# Patient Record
Sex: FEMALE | Race: BLACK OR AFRICAN AMERICAN | NOT HISPANIC OR LATINO
[De-identification: names, ages, dates, MRNs, and addresses within clinical notes are randomized per-mention and may not be internally consistent; named-entity substitution may affect disease eponyms.]

---

## 2019-08-19 ENCOUNTER — APPOINTMENT (OUTPATIENT)
Dept: PULMONOLOGY | Facility: CLINIC | Age: 54
End: 2019-08-19
Payer: COMMERCIAL

## 2019-08-19 VITALS
SYSTOLIC BLOOD PRESSURE: 110 MMHG | OXYGEN SATURATION: 98 % | TEMPERATURE: 97.3 F | DIASTOLIC BLOOD PRESSURE: 80 MMHG | BODY MASS INDEX: 39.93 KG/M2 | HEART RATE: 77 BPM | HEIGHT: 62 IN | WEIGHT: 217 LBS

## 2019-08-19 DIAGNOSIS — Z82.5 FAMILY HISTORY OF ASTHMA AND OTHER CHRONIC LOWER RESPIRATORY DISEASES: ICD-10-CM

## 2019-08-19 DIAGNOSIS — Z86.79 PERSONAL HISTORY OF OTHER DISEASES OF THE CIRCULATORY SYSTEM: ICD-10-CM

## 2019-08-19 DIAGNOSIS — Z87.891 PERSONAL HISTORY OF NICOTINE DEPENDENCE: ICD-10-CM

## 2019-08-19 PROCEDURE — 94010 BREATHING CAPACITY TEST: CPT

## 2019-08-19 PROCEDURE — 99203 OFFICE O/P NEW LOW 30 MIN: CPT | Mod: 25

## 2019-08-19 RX ORDER — TRIAMTERENE
POWDER (GRAM) MISCELLANEOUS
Refills: 0 | Status: ACTIVE | COMMUNITY

## 2019-08-28 NOTE — ASSESSMENT
[FreeTextEntry1] : Data reviewed:\par \par [place] 08/19/2019 : normal\par \par Impression:\par Unspecified chest imaging abnormality several years ago\par Former smoker, 40py, but quit 20 years ago\par \par Plan:\par Unfortunately, there is not much I can do with this consultation unless some additional information is provided. The patient should contact her previous pulmonologist's office or her internist's office and see where these images were obtained. Then she should provide the images on disk as well as the report, so that I can determine whether additional imaging is necessary.\par --\par 8/28/19: The patient provided her outside disk dated 6/17/16 but no report. I do not see any significant abnormality on review of the disk. Will ask her to provide a report. Disk filed in my cabinet. / Report received. 4mm calcified granuloma and 4mm nodule. No follow up is required. JASWINDER on .

## 2019-08-28 NOTE — HISTORY OF PRESENT ILLNESS
[FreeTextEntry1] : 08/19/2019: Asked to evaluate patient by Dr Mccoy for abnormal chest imaging. Had an abnormal CXR years ago in the setting of pleurisy. Has seen Dr Manuel for these bouts of pleurisy and had a CT chest at [facility]. She doesn't have the results of this. Smoked 2ppd x 20 years and quit 20 years ago. No dyspnea or cough now. No pleurisy in 2 years.\par

## 2019-08-28 NOTE — PHYSICAL EXAM
[General Appearance - Well Developed] : well developed [General Appearance - Well Nourished] : well nourished [General Appearance - In No Acute Distress] : no acute distress [Normal Oropharynx] : normal oropharynx [Normal Conjunctiva] : the conjunctiva exhibited no abnormalities [Heart Rate And Rhythm] : heart rate and rhythm were normal [Heart Sounds] : normal S1 and S2 [Murmurs] : no murmurs present [Edema] : no peripheral edema present [Bowel Sounds] : normal bowel sounds [Auscultation Breath Sounds / Voice Sounds] : lungs were clear to auscultation bilaterally [Abdomen Soft] : soft [Abdomen Tenderness] : non-tender [Nail Clubbing] : no clubbing of the fingernails [Cyanosis, Localized] : no localized cyanosis [] : no rash [Affect] : the affect was normal [FreeTextEntry1] : no LAD

## 2019-08-28 NOTE — CONSULT LETTER
[Dear  ___] : Dear  [unfilled], [( Thank you for referring [unfilled] for consultation for _____ )] : Thank you for referring [unfilled] for consultation for [unfilled] [Consult Closing:] : Thank you very much for allowing me to participate in the care of this patient.  If you have any questions, please do not hesitate to contact me. [Sincerely,] : Sincerely, [FreeTextEntry1] : Unfortunately, no reports or images were provided from these reportedly abnormal studies, so I cannot ascertain whether repeat imaging is indicated. If you have any relevant information, could you kindly forward it to my office? [FreeTextEntry2] : Victoria Mccoy MD\par [address]\[place] [address] [FreeTextEntry3] : Emily Burns MD, FCCP\par

## 2020-01-12 ENCOUNTER — TRANSCRIPTION ENCOUNTER (OUTPATIENT)
Age: 55
End: 2020-01-12

## 2020-01-30 ENCOUNTER — APPOINTMENT (OUTPATIENT)
Dept: UROLOGY | Facility: CLINIC | Age: 55
End: 2020-01-30
Payer: COMMERCIAL

## 2020-01-30 VITALS
TEMPERATURE: 98.7 F | SYSTOLIC BLOOD PRESSURE: 132 MMHG | BODY MASS INDEX: 42.6 KG/M2 | HEART RATE: 67 BPM | WEIGHT: 217 LBS | DIASTOLIC BLOOD PRESSURE: 87 MMHG | HEIGHT: 60 IN

## 2020-01-30 DIAGNOSIS — Z00.00 ENCOUNTER FOR GENERAL ADULT MEDICAL EXAMINATION W/OUT ABNORMAL FINDINGS: ICD-10-CM

## 2020-01-30 DIAGNOSIS — N30.00 ACUTE CYSTITIS W/OUT HEMATURIA: ICD-10-CM

## 2020-01-30 PROCEDURE — 81003 URINALYSIS AUTO W/O SCOPE: CPT | Mod: QW

## 2020-01-30 PROCEDURE — 99215 OFFICE O/P EST HI 40 MIN: CPT

## 2020-01-30 RX ORDER — METHENAMINE, SODIUM PHOSPHATE, MONOBASIC, MONOHYDRATE, PHENYL SALICYLATE, METHYLENE BLUE, AND HYOSCYAMINE SULFATE 118; 40.8; 36; 10; .12 MG/1; MG/1; MG/1; MG/1; MG/1
118 CAPSULE ORAL 4 TIMES DAILY
Qty: 16 | Refills: 3 | Status: ACTIVE | COMMUNITY
Start: 2020-01-30 | End: 1900-01-01

## 2020-01-30 NOTE — HISTORY OF PRESENT ILLNESS
[FreeTextEntry1] : Pt is a 53 yo former pt of Dr. Minaya who historically has been treated with bladder instillations for bladder irritability.  At the time of her last visit (8/21/19-see old chart) she had increased bladder symptoms likely stemming from her pelvic floor as her gait has changed immediately following left total hip replacement.  \par \par She presents today with "bladder burning".  She denies dysuria.   She was recently treated with a round of oral antibiotics by her gynecologist for a "parasitic infection" after sexual intercourse with a new partner.  She states her vaginal discharge has resolved but she has suprapubic discomfort.  \par \par \par Her CT scan and cystoscopy in 2017 were negative.

## 2020-01-30 NOTE — LETTER BODY
[Dear  ___] : Dear  [unfilled], [Consult Letter:] : I had the pleasure of evaluating your patient, [unfilled]. [Please see my note below.] : Please see my note below. [Sincerely,] : Sincerely, [Consult Closing:] : Thank you very much for allowing me to participate in the care of this patient.  If you have any questions, please do not hesitate to contact me. [FreeTextEntry3] : Dr. Alia Luo\par

## 2020-01-30 NOTE — PHYSICAL EXAM
[General Appearance - Well Developed] : well developed [General Appearance - Well Nourished] : well nourished [Normal Appearance] : normal appearance [Well Groomed] : well groomed [General Appearance - In No Acute Distress] : no acute distress [Abdomen Soft] : soft [Abdomen Tenderness] : non-tender [Costovertebral Angle Tenderness] : no ~M costovertebral angle tenderness [Urinary Bladder Findings] : the bladder was normal on palpation [Edema] : no peripheral edema [] : no respiratory distress [Exaggerated Use Of Accessory Muscles For Inspiration] : no accessory muscle use [Respiration, Rhythm And Depth] : normal respiratory rhythm and effort [Oriented To Time, Place, And Person] : oriented to person, place, and time [Affect] : the affect was normal [Mood] : the mood was normal [Not Anxious] : not anxious [Normal Station and Gait] : the gait and station were normal for the patient's age [No Focal Deficits] : no focal deficits [No Palpable Adenopathy] : no palpable adenopathy

## 2020-01-30 NOTE — ASSESSMENT
[FreeTextEntry1] : 53 yo female with recent vaginal infection now with suprapubic discomfort.   I will rule out infectious etiology and if she is no better by next week, she will return.   In the meantime I have given her Uribel to keep her comfortable.

## 2020-01-31 LAB
BILIRUB UR QL STRIP: NORMAL
CLARITY UR: CLEAR
COLLECTION METHOD: NORMAL
GLUCOSE UR-MCNC: NORMAL
HCG UR QL: 0.2 EU/DL
HGB UR QL STRIP.AUTO: NORMAL
KETONES UR-MCNC: NORMAL
LEUKOCYTE ESTERASE UR QL STRIP: NORMAL
NITRITE UR QL STRIP: NORMAL
PH UR STRIP: 6.5
PROT UR STRIP-MCNC: NORMAL
SP GR UR STRIP: 1.02

## 2020-02-03 LAB — BACTERIA UR CULT: NORMAL

## 2020-02-04 LAB — URINE CYTOLOGY: NORMAL

## 2020-02-12 LAB
MYCOPLASMA HOMINIS CULTURE: NORMAL
UREA SPECIMEN SOURCE: NORMAL
UREAPLASMA CULTURE: NORMAL
UREAPLASMA, PRELIMINARY CULTURE: NORMAL

## 2021-07-25 ENCOUNTER — EMERGENCY (EMERGENCY)
Facility: HOSPITAL | Age: 56
LOS: 1 days | Discharge: HOME | End: 2021-07-25
Attending: EMERGENCY MEDICINE | Admitting: EMERGENCY MEDICINE
Payer: COMMERCIAL

## 2021-07-25 VITALS
SYSTOLIC BLOOD PRESSURE: 136 MMHG | DIASTOLIC BLOOD PRESSURE: 74 MMHG | HEART RATE: 73 BPM | RESPIRATION RATE: 16 BRPM | OXYGEN SATURATION: 99 % | TEMPERATURE: 98 F

## 2021-07-25 VITALS
HEART RATE: 76 BPM | OXYGEN SATURATION: 98 % | DIASTOLIC BLOOD PRESSURE: 83 MMHG | RESPIRATION RATE: 16 BRPM | WEIGHT: 229.94 LBS | HEIGHT: 67 IN | TEMPERATURE: 98 F | SYSTOLIC BLOOD PRESSURE: 134 MMHG

## 2021-07-25 DIAGNOSIS — R00.0 TACHYCARDIA, UNSPECIFIED: ICD-10-CM

## 2021-07-25 DIAGNOSIS — Z20.822 CONTACT WITH AND (SUSPECTED) EXPOSURE TO COVID-19: ICD-10-CM

## 2021-07-25 DIAGNOSIS — R25.2 CRAMP AND SPASM: ICD-10-CM

## 2021-07-25 DIAGNOSIS — R07.89 OTHER CHEST PAIN: ICD-10-CM

## 2021-07-25 DIAGNOSIS — K21.9 GASTRO-ESOPHAGEAL REFLUX DISEASE WITHOUT ESOPHAGITIS: ICD-10-CM

## 2021-07-25 DIAGNOSIS — Z79.82 LONG TERM (CURRENT) USE OF ASPIRIN: ICD-10-CM

## 2021-07-25 LAB
ALBUMIN SERPL ELPH-MCNC: 4.5 G/DL — SIGNIFICANT CHANGE UP (ref 3.3–5)
ALP SERPL-CCNC: 72 U/L — SIGNIFICANT CHANGE UP (ref 40–120)
ALT FLD-CCNC: 17 U/L — SIGNIFICANT CHANGE UP (ref 10–45)
ANION GAP SERPL CALC-SCNC: 8 MMOL/L — SIGNIFICANT CHANGE UP (ref 5–17)
APTT BLD: 35.3 SEC — SIGNIFICANT CHANGE UP (ref 27.5–35.5)
AST SERPL-CCNC: 22 U/L — SIGNIFICANT CHANGE UP (ref 10–40)
BASOPHILS # BLD AUTO: 0.13 K/UL — SIGNIFICANT CHANGE UP (ref 0–0.2)
BASOPHILS NFR BLD AUTO: 2.7 % — HIGH (ref 0–2)
BILIRUB SERPL-MCNC: 0.2 MG/DL — SIGNIFICANT CHANGE UP (ref 0.2–1.2)
BUN SERPL-MCNC: 16 MG/DL — SIGNIFICANT CHANGE UP (ref 7–23)
CALCIUM SERPL-MCNC: 9.7 MG/DL — SIGNIFICANT CHANGE UP (ref 8.4–10.5)
CHLORIDE SERPL-SCNC: 104 MMOL/L — SIGNIFICANT CHANGE UP (ref 96–108)
CO2 SERPL-SCNC: 31 MMOL/L — SIGNIFICANT CHANGE UP (ref 22–31)
CREAT SERPL-MCNC: 1.22 MG/DL — SIGNIFICANT CHANGE UP (ref 0.5–1.3)
D DIMER BLD IA.RAPID-MCNC: <150 NG/ML DDU — SIGNIFICANT CHANGE UP
EOSINOPHIL # BLD AUTO: 0.04 K/UL — SIGNIFICANT CHANGE UP (ref 0–0.5)
EOSINOPHIL NFR BLD AUTO: 0.9 % — SIGNIFICANT CHANGE UP (ref 0–6)
GLUCOSE SERPL-MCNC: 87 MG/DL — SIGNIFICANT CHANGE UP (ref 70–99)
HCT VFR BLD CALC: 41.4 % — SIGNIFICANT CHANGE UP (ref 34.5–45)
HGB BLD-MCNC: 12.4 G/DL — SIGNIFICANT CHANGE UP (ref 11.5–15.5)
INR BLD: 0.99 — SIGNIFICANT CHANGE UP (ref 0.88–1.16)
LYMPHOCYTES # BLD AUTO: 1.67 K/UL — SIGNIFICANT CHANGE UP (ref 1–3.3)
LYMPHOCYTES # BLD AUTO: 33.9 % — SIGNIFICANT CHANGE UP (ref 13–44)
MCHC RBC-ENTMCNC: 21.8 PG — LOW (ref 27–34)
MCHC RBC-ENTMCNC: 30 GM/DL — LOW (ref 32–36)
MCV RBC AUTO: 72.9 FL — LOW (ref 80–100)
MONOCYTES # BLD AUTO: 0.48 K/UL — SIGNIFICANT CHANGE UP (ref 0–0.9)
MONOCYTES NFR BLD AUTO: 9.8 % — SIGNIFICANT CHANGE UP (ref 2–14)
NEUTROPHILS # BLD AUTO: 2.34 K/UL — SIGNIFICANT CHANGE UP (ref 1.8–7.4)
NEUTROPHILS NFR BLD AUTO: 47.3 % — SIGNIFICANT CHANGE UP (ref 43–77)
NT-PROBNP SERPL-SCNC: 157 PG/ML — SIGNIFICANT CHANGE UP (ref 0–300)
PLATELET # BLD AUTO: 311 K/UL — SIGNIFICANT CHANGE UP (ref 150–400)
POTASSIUM SERPL-MCNC: 3.6 MMOL/L — SIGNIFICANT CHANGE UP (ref 3.5–5.3)
POTASSIUM SERPL-SCNC: 3.6 MMOL/L — SIGNIFICANT CHANGE UP (ref 3.5–5.3)
PROT SERPL-MCNC: 7.4 G/DL — SIGNIFICANT CHANGE UP (ref 6–8.3)
PROTHROM AB SERPL-ACNC: 11.9 SEC — SIGNIFICANT CHANGE UP (ref 10.6–13.6)
RBC # BLD: 5.68 M/UL — HIGH (ref 3.8–5.2)
RBC # FLD: 15.8 % — HIGH (ref 10.3–14.5)
SARS-COV-2 RNA SPEC QL NAA+PROBE: NEGATIVE — SIGNIFICANT CHANGE UP
SODIUM SERPL-SCNC: 143 MMOL/L — SIGNIFICANT CHANGE UP (ref 135–145)
TROPONIN T SERPL-MCNC: 0.01 NG/ML — SIGNIFICANT CHANGE UP (ref 0–0.01)
TROPONIN T SERPL-MCNC: 0.01 NG/ML — SIGNIFICANT CHANGE UP (ref 0–0.01)
WBC # BLD: 4.94 K/UL — SIGNIFICANT CHANGE UP (ref 3.8–10.5)
WBC # FLD AUTO: 4.94 K/UL — SIGNIFICANT CHANGE UP (ref 3.8–10.5)

## 2021-07-25 PROCEDURE — 93005 ELECTROCARDIOGRAM TRACING: CPT

## 2021-07-25 PROCEDURE — 85610 PROTHROMBIN TIME: CPT

## 2021-07-25 PROCEDURE — 93010 ELECTROCARDIOGRAM REPORT: CPT

## 2021-07-25 PROCEDURE — 36415 COLL VENOUS BLD VENIPUNCTURE: CPT

## 2021-07-25 PROCEDURE — 71045 X-RAY EXAM CHEST 1 VIEW: CPT | Mod: 26

## 2021-07-25 PROCEDURE — 85379 FIBRIN DEGRADATION QUANT: CPT

## 2021-07-25 PROCEDURE — 87635 SARS-COV-2 COVID-19 AMP PRB: CPT

## 2021-07-25 PROCEDURE — 99283 EMERGENCY DEPT VISIT LOW MDM: CPT | Mod: 25

## 2021-07-25 PROCEDURE — 71045 X-RAY EXAM CHEST 1 VIEW: CPT

## 2021-07-25 PROCEDURE — 99285 EMERGENCY DEPT VISIT HI MDM: CPT | Mod: 25

## 2021-07-25 PROCEDURE — 83880 ASSAY OF NATRIURETIC PEPTIDE: CPT

## 2021-07-25 PROCEDURE — 85730 THROMBOPLASTIN TIME PARTIAL: CPT

## 2021-07-25 PROCEDURE — 85025 COMPLETE CBC W/AUTO DIFF WBC: CPT

## 2021-07-25 PROCEDURE — 80053 COMPREHEN METABOLIC PANEL: CPT

## 2021-07-25 PROCEDURE — 84484 ASSAY OF TROPONIN QUANT: CPT

## 2021-07-25 NOTE — ED PROVIDER NOTE - OBJECTIVE STATEMENT
56F with a h/o GERD and "pleuritic attacks" in the past who p/w left sided squeezing sharp chest pain that feels similar to her pleuritic attacks in the past however she has not had one in 7 years. She took 4 baby aspirin and now the pain is gone. No f/c, no sob, no n/v, no abd pain, no ha or neck pain, no dizziness or syncope, no n/t/w in ext. No other complaints. Denies hx of VTE or CAD. Report negative stress test many years ago.

## 2021-07-25 NOTE — ED PROVIDER NOTE - WR INTERPRETATION 1
Patient/Caregiver provided printed discharge information.
CXR negative - No pneumothorax, No opacities, No free air

## 2021-07-25 NOTE — ED ADULT TRIAGE NOTE - CHIEF COMPLAINT QUOTE
pt c/o left sided chest pain that started 12 minutes, worsening with movement. pt states " It feels like a pleuritic attack." denies cough, fever.

## 2021-07-25 NOTE — ED PROVIDER NOTE - PHYSICAL EXAMINATION
GEN: Well appearing, well developed, awake, alert, oriented to person, place, time/situation and in no apparent distress. NTAF  ENT: Airway patent, Nasal mucosa clear. Mouth with normal mucosa.  EYES: Clear bilaterally. PERRL, EOMI  RESPIRATORY: Breathing comfortably with normal RR. No W/C/R, no hypoxia or resp distress.  CARDIAC: Regular rate and rhythm, no M/R/G  ABDOMEN: Soft, nontender, +bowel sounds, no rebound, rigidity, or guarding.  MSK: Range of motion is not limited, no deformities noted.  NEURO: Alert and oriented, no focal deficits.  SKIN: Skin normal color for race, warm, dry and intact. No evidence of rash.  PSYCH: Alert and oriented to person, place, time/situation. normal mood and affect. no apparent risk to self or others.

## 2021-07-25 NOTE — ED PROVIDER NOTE - CLINICAL SUMMARY MEDICAL DECISION MAKING FREE TEXT BOX
50F with a h/o gerd and inappropriate tachycardia (not on any meds for this) who p/w 3 days of substernal chest pain, sharp stabbing, keeping her awake at night, associated with right calf cramping. Pt denies any change in her usual routine, no new meds or new exercise/trauma. She did not take anything for pain bc she cannot take nsaid due to herd. States pain in currently 1-2/10, she went to urgent care and was told to come to the ER for further eval. She reports "basically negative" stress test with her cardiologist last year. No f/c, no ob, no n/v, no abd pain, no ha or neck pain, no dizziness or syncope, no n/t/w in ext. No other complaints.    Pt well-appearing on arrival, VSS, EKG nsr, no stemi, plan for labs, including DD and trop x 2, r/o pe, r/o acs. Dispo pending workup 50F with a h/o gerd and inappropriate tachycardia (not on any meds for this) who p/w 3 days of substernal chest pain, sharp stabbing, keeping her awake at night, associated with right calf cramping. Pt denies any change in her usual routine, no new meds or new exercise/trauma. She did not take anything for pain bc she cannot take nsaid due to herd. States pain in currently 1-2/10, she went to urgent care and was told to come to the ER for further eval. She reports "basically negative" stress test with her cardiologist last year. No f/c, no ob, no n/v, no abd pain, no ha or neck pain, no dizziness or syncope, no n/t/w in ext. No other complaints.   Pt took 4 baby asa PTA and now has no pain.    Pt well-appearing on arrival, VSS, EKG nsr, no stemi, plan for labs, including DD and trop x 2, r/o pe, r/o acs. Dispo pending workup    LAbs and CXR neg. Pt remains pain free. Low suspicion for acs, pe, dissection or other life threatening process.   The patient is stable for DC and is feeling much better.  Symptoms have improved and after discussion regarding discharge, patient feels comfortable going home.  Answers to all questions provided.  Patient feeling satisfactory with care and follow up plan discussed. They were advised to call their PMD for prompt outpatient follow up. Return precautions were discussed. The patient was advised to return to the ER for any concerning or worsening symptoms.

## 2021-07-25 NOTE — ED PROVIDER NOTE - PATIENT PORTAL LINK FT
You can access the FollowMyHealth Patient Portal offered by [facility] by registering at the following website: [email or web address]. By joining [facility]’s FollowMyHealth portal, you will also be able to view your health information using other applications (apps) compatible with our system.

## 2021-07-25 NOTE — ED PROVIDER NOTE - NSFOLLOWUPINSTRUCTIONS_ED_ALL_ED_FT
Please follow up with your primary care physician. If you have any problem getting an appointment this week, please call the ED Referral Coordinator at [phone].  Return to the Emergency Department if you have any new or worsening symptoms, or for any other concerns. Please read below for further information.    Chest Pain    Chest pain can be caused by many different conditions which may or may not be dangerous. Causes include heartburn, lung infections, heart attack, blood clot in lungs, skin infections, strain or damage to muscle, cartilage, or bones, etc. In addition to a history and physical examination, an electrocardiogram (ECG) or other lab tests may have been performed to determine the cause of your chest pain. Follow up with your primary care provider or with a cardiologist as instructed.     SEEK IMMEDIATE MEDICAL CARE IF YOU HAVE ANY OF THE FOLLOWING SYMPTOMS: worsening chest pain, coughing up blood, unexplained back/neck/jaw pain, severe abdominal pain, dizziness or lightheadedness, fainting, shortness of breath, sweaty or clammy skin, vomiting, or racing heart beat. These symptoms may represent a serious problem that is an emergency. Do not wait to see if the symptoms will go away. Get medical help right away. Call 911 and do not drive yourself to the hospital.

## 2021-12-13 ENCOUNTER — APPOINTMENT (OUTPATIENT)
Dept: UROLOGY | Facility: CLINIC | Age: 56
End: 2021-12-13

## 2022-01-05 ENCOUNTER — APPOINTMENT (OUTPATIENT)
Dept: UROLOGY | Facility: CLINIC | Age: 57
End: 2022-01-05

## 2022-01-15 ENCOUNTER — EMERGENCY (EMERGENCY)
Facility: HOSPITAL | Age: 57
LOS: 1 days | Discharge: HOME | End: 2022-01-15
Attending: EMERGENCY MEDICINE | Admitting: EMERGENCY MEDICINE
Payer: COMMERCIAL

## 2022-01-15 VITALS
TEMPERATURE: 98 F | DIASTOLIC BLOOD PRESSURE: 93 MMHG | RESPIRATION RATE: 18 BRPM | HEIGHT: 62 IN | WEIGHT: 229.06 LBS | OXYGEN SATURATION: 98 % | SYSTOLIC BLOOD PRESSURE: 153 MMHG | HEART RATE: 82 BPM

## 2022-01-15 VITALS
DIASTOLIC BLOOD PRESSURE: 82 MMHG | OXYGEN SATURATION: 100 % | SYSTOLIC BLOOD PRESSURE: 133 MMHG | HEART RATE: 75 BPM | TEMPERATURE: 98 F | RESPIRATION RATE: 18 BRPM
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
## 2022-01-15 DIAGNOSIS — R07.81 PLEURODYNIA: ICD-10-CM
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
## 2022-01-15 DIAGNOSIS — M25.552 PAIN IN LEFT HIP: ICD-10-CM

## 2022-01-15 DIAGNOSIS — M25.562 PAIN IN LEFT KNEE: ICD-10-CM

## 2022-01-15 PROCEDURE — 71250 CT THORAX DX C-: CPT | Mod: MG

## 2022-01-15 PROCEDURE — 99284 EMERGENCY DEPT VISIT MOD MDM: CPT | Mod: 25

## 2022-01-15 PROCEDURE — 99284 EMERGENCY DEPT VISIT MOD MDM: CPT

## 2022-01-15 PROCEDURE — G1004: CPT

## 2022-01-15 PROCEDURE — 71250 CT THORAX DX C-: CPT | Mod: 26,MG

## 2022-01-15 RX ORDER — TRIAMTERENE/HYDROCHLOROTHIAZID 75 MG-50MG
1 TABLET ORAL
Qty: 0 | Refills: 0 | DISCHARGE

## 2022-01-15 RX ORDER — ANASTROZOLE 1 MG/1
1 TABLET ORAL
Qty: 0 | Refills: 0 | DISCHARGE

## 2022-01-15 RX ORDER — OXYCODONE AND ACETAMINOPHEN 5; 325 MG/1; MG/1
1 TABLET ORAL ONCE
Refills: 0 | Status: DISCONTINUED | OUTPATIENT
Start: 2022-01-15 | End: 2022-01-15

## 2022-01-15 RX ORDER — PANTOPRAZOLE SODIUM 20 MG/1
1 TABLET, DELAYED RELEASE ORAL
Qty: 0 | Refills: 0 | DISCHARGE

## 2022-01-15 RX ADMIN — OXYCODONE AND ACETAMINOPHEN 1 TABLET(S): 5; 325 TABLET ORAL at 21:40

## 2022-01-15 RX ADMIN — OXYCODONE AND ACETAMINOPHEN 1 TABLET(S): 5; 325 TABLET ORAL at 20:36

## 2022-01-15 NOTE — ED PROVIDER NOTE - ATTENDING CONTRIBUTION TO CARE
56 F no sig pmh- slip and fall- today- co L sided pain- no head strike  L hip and L chest wall and L knee pain  xray of hip and knee neg at c  rib xrays- rib fx on cxr  sent for CT  s1s2 lungs cta bl  abd soft nt nd +bs  IMP- probable rib fx  check CT chest

## 2022-01-15 NOTE — ED PROVIDER NOTE - PATIENT PORTAL LINK FT
You can access the FollowMyHealth Patient Portal offered by [facility] by registering at the following website: [email or web address]. By joining [facility]’s FollowMyHealth portal, you will also be able to view your health information using other applications (apps) compatible with our system.
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
## 2022-01-15 NOTE — ED ADULT NURSE NOTE - NSIMPLEMENTINTERV_GEN_ALL_ED
Implemented All Fall Risk Interventions:  [place] to call system. Call bell, personal items and telephone within reach. Instruct patient to call for assistance. Room bathroom lighting operational. Non-slip footwear when patient is off stretcher. Physically safe environment: no spills, clutter or unnecessary equipment. Stretcher in lowest position, wheels locked, appropriate side rails in place. Provide visual cue, wrist band, yellow gown, etc. Monitor gait and stability. Monitor for mental status changes and reorient to person, place, and time. Review medications for side effects contributing to fall risk. Reinforce activity limits and safety measures with patient and family.

## 2022-01-15 NOTE — ED ADULT NURSE NOTE - OBJECTIVE STATEMENT
Pt presented to the ED with complaints of rib pain/injury. As per pt, she slipped and fell earlier today, went to [facility] urgent care for scans, told she had multiple rib fractures and referred to the ED for further evaluation. On arrival, pt is alert and oriented, ambulatory, denies LOC or use of blood thinners, chest pain, palpitations, fever, nausea, or vomiting.

## 2022-01-15 NOTE — ED PROVIDER NOTE - MUSCULOSKELETAL, MLM
(+) Left-sided chest wall tenderness anteriorly with no crepitus, no obvious deformity, no overlying ecchymosis or skin deformation. No cervical, thoracic, and lumbar spine tenderness. (+) mild tenderness to the left hip with no obvious deformity or overlying ecchymosis. (+) mild tenderness to the left knee with no obvious deformity or overlying ecchymosis. Full range of motion of hip and knee

## 2022-01-15 NOTE — ED PROVIDER NOTE - ENMT, MLM
Head is normocephalic and atraumatic. Airway patent, Nasal mucosa clear. Mouth with normal mucosa. Throat has no vesicles, no oropharyngeal exudates and uvula is midline.

## 2022-01-15 NOTE — ED PROVIDER NOTE - OBJECTIVE STATEMENT
55 y/o F pt w/ no significant PMHx, not on anticoagulants, presents to the ED c/o rib pain, left-sided knee pain, and left-sided hip pain s/p trip and fall this morning. Patient went to urgent care today where she was told that she has rib fractures on both sides of her chest. Reports that she mostly fell on her left side with no LOC. Does not think that she hit her head. Patient is also c/o left hip and left knee pain. Notes that she was ambulatory s/p fall and was able to walk to urgent care. Patient also had XRs of the left hip and left that were negative. Reports that she has not taken anything for the pain. Chest wall pain worsens while laying down. Denies SOB, head trauma, back pain, neck pain, numbness, weakness, tingling, LOC, vision changes, HA, or any other complaints.

## 2022-06-23 PROBLEM — Z78.9 OTHER SPECIFIED HEALTH STATUS: Chronic | Status: ACTIVE | Noted: 2022-01-15

## 2022-07-12 ENCOUNTER — APPOINTMENT (OUTPATIENT)
Dept: UROLOGY | Facility: CLINIC | Age: 57
End: 2022-07-12

## 2022-07-12 VITALS
HEIGHT: 62 IN | HEART RATE: 75 BPM | DIASTOLIC BLOOD PRESSURE: 87 MMHG | TEMPERATURE: 97.5 F | SYSTOLIC BLOOD PRESSURE: 134 MMHG | OXYGEN SATURATION: 98 %

## 2022-07-12 PROCEDURE — 99213 OFFICE O/P EST LOW 20 MIN: CPT

## 2022-07-12 PROCEDURE — 51798 US URINE CAPACITY MEASURE: CPT

## 2022-07-12 PROCEDURE — 81003 URINALYSIS AUTO W/O SCOPE: CPT | Mod: QW

## 2022-07-13 NOTE — LETTER BODY
[Dear  ___] : Dear  [unfilled], [Consult Letter:] : I had the pleasure of evaluating your patient, [unfilled]. [Please see my note below.] : Please see my note below. [Consult Closing:] : Thank you very much for allowing me to participate in the care of this patient.  If you have any questions, please do not hesitate to contact me. [Sincerely,] : Sincerely, [FreeTextEntry3] : Dr. Alia Luo\par

## 2022-07-13 NOTE — HISTORY OF PRESENT ILLNESS
[FreeTextEntry1] : Pt is a 58 yo former pt of Dr. Minaya who historically has been treated with bladder instillations for bladder irritability.  At the time of her last visit (8/21/19-see old chart) she had increased bladder symptoms likely stemming from her pelvic floor as her gait has changed immediately following left total hip replacement.  \par \par She returns today with microhematuria and UTI-like symptoms notably urge incontinence, "pressure", and persistent burning + foul urine odor that began 2 months ago. Pt states she initially seen her gyn -ucx was negative, second time she was informed of microhematuria. She then seen her pcp ucx at that time was unremarkable. Pt started taking nitrofurantoin Bernabe 7/10/22 prescribed by her gyn and she reports some improvement, although urine culture ? negative.  Denies gross hematuria/fever/chills. \par \par Udip: (+) small ofe.\par PVR: 1 cc (to rule out incomplete bladder emptying) \par \par SH: former smoker (quit 16 years ag) \par \par 1/30/2020--She presents today with "bladder burning".  She denies dysuria.   She was recently treated with a round of oral antibiotics by her gynecologist for a "parasitic infection" after sexual intercourse with a new partner.  She states her vaginal discharge has resolved but she has suprapubic discomfort.  \par \par \par Her CT scan and cystoscopy in 2017 were negative.

## 2022-07-13 NOTE — ASSESSMENT
[FreeTextEntry1] : 56 yo female with recent vaginal infection now with suprapubic discomfort.  I obtained a striaght -cath specimen and vaginal swab specimen both to be sent of culture. In the meantime, to manage her symptoms I've prescribed her with Pyridium.  I will be in touch with the results.  \par \par Patient expressed understanding. \par

## 2022-07-13 NOTE — ADDENDUM
[FreeTextEntry1] : A portion of this note was written by [Dottie Bernabe] on 07/12/2022 acting as a scribe for Dr. Luo. \par \par I have personally reviewed the chart and agree that the record accurately reflects my personal performance of the history, physical exam, assessment, and plan.

## 2022-07-15 DIAGNOSIS — N39.0 URINARY TRACT INFECTION, SITE NOT SPECIFIED: ICD-10-CM

## 2022-07-15 LAB
BACTERIA GENITAL AEROBE CULT: NORMAL
BILIRUB UR QL STRIP: NEGATIVE
GLUCOSE UR-MCNC: NEGATIVE
HCG UR QL: 0.2 EU/DL
HGB UR QL STRIP.AUTO: NEGATIVE
KETONES UR-MCNC: NEGATIVE
LEUKOCYTE ESTERASE UR QL STRIP: NORMAL
NITRITE UR QL STRIP: NEGATIVE
PH UR STRIP: 6
PROT UR STRIP-MCNC: NEGATIVE
SP GR UR STRIP: 1.02

## 2022-07-15 RX ORDER — NITROFURANTOIN (MONOHYDRATE/MACROCRYSTALS) 25; 75 MG/1; MG/1
100 CAPSULE ORAL TWICE DAILY
Qty: 10 | Refills: 0 | Status: ACTIVE | COMMUNITY
Start: 2022-07-15 | End: 1900-01-01

## 2022-07-19 LAB — BACTERIA UR CULT: ABNORMAL

## 2022-07-19 RX ORDER — FLUCONAZOLE 150 MG/1
150 TABLET ORAL
Qty: 2 | Refills: 0 | Status: ACTIVE | COMMUNITY
Start: 2022-07-19 | End: 1900-01-01

## 2022-08-04 NOTE — ED ADULT NURSE NOTE - NEURO MENTATION
What Type Of Note Output Would You Prefer (Optional)?: Standard Output
How Severe Is Your Rash?: moderate
Is This A New Presentation, Or A Follow-Up?: Rash
normal

## 2022-08-22 ENCOUNTER — EMERGENCY (EMERGENCY)
Facility: HOSPITAL | Age: 57
LOS: 1 days | Discharge: HOME | End: 2022-08-22
Attending: STUDENT IN AN ORGANIZED HEALTH CARE EDUCATION/TRAINING PROGRAM | Admitting: STUDENT IN AN ORGANIZED HEALTH CARE EDUCATION/TRAINING PROGRAM
Payer: COMMERCIAL

## 2022-08-22 VITALS
WEIGHT: 223.99 LBS | RESPIRATION RATE: 17 BRPM | TEMPERATURE: 98 F | OXYGEN SATURATION: 100 % | SYSTOLIC BLOOD PRESSURE: 152 MMHG | HEIGHT: 62 IN | HEART RATE: 72 BPM | DIASTOLIC BLOOD PRESSURE: 94 MMHG

## 2022-08-22 PROCEDURE — 99283 EMERGENCY DEPT VISIT LOW MDM: CPT | Mod: 25

## 2022-08-22 PROCEDURE — 73130 X-RAY EXAM OF HAND: CPT | Mod: 26,LT

## 2022-08-22 PROCEDURE — 73130 X-RAY EXAM OF HAND: CPT

## 2022-08-22 PROCEDURE — 99283 EMERGENCY DEPT VISIT LOW MDM: CPT

## 2022-08-22 NOTE — ED PROVIDER NOTE - OBJECTIVE STATEMENT
58 y/o Female with no PMHx presents to the ED c/o L-hand pain. Pt was at [employer] yesterday looking at earrings and had her hand resting on the display case when one of the other display cases fell on hand. Noted swelling on hand and went to urgent care where she had a normal X-RAY with no significant findings and stated mild ecchymosis appeared earlier today. Pt called her PCD who then referred her to the ED.

## 2022-08-22 NOTE — ED PROVIDER NOTE - PHYSICAL EXAMINATION
CONST: nontoxic NAD speaking in full sentences  HEAD: atraumatic  EYES: conjunctivae clear, PERRL, EOMI  ENT: mmm  NECK: supple/FROM, nttp, no jvd  CARD: rrr no murmurs  CHEST: ctab no r/r/w, no stridor/retractions/tripoding  ABD: soft, nd, nttp, no rebound/guarding  EXT: FROM, symmetric distal pulses intact. left hand 2+ pulse mild ecchymosis over 4th and 5th metacarpals, however, no bony tenderness. Full ROM including abduction and adduction.  SKIN: warm, dry, no rash, no pedal edema/ttp/rash, cap refill <2sec  NEURO: a+ox3, 5/5 strength x4, gross sensation intact x4, baseline gait

## 2022-08-22 NOTE — ED ADULT TRIAGE NOTE - CHIEF COMPLAINT QUOTE
pt c/o left wrist and hand pain since yesterday. stated " she was at [employer] and a metal tray fell on her hand, went to , had x-ray done and was told no fracture, but she is still in pain and can't move hand much, recommended by her PCP to come to ED"

## 2022-08-22 NOTE — ED PROVIDER NOTE - PATIENT PORTAL LINK FT
You can access the FollowMyHealth Patient Portal offered by [facility] by registering at the following website: [email or web address]. By joining [facility]’s FollowMyHealth portal, you will also be able to view your health information using other applications (apps) compatible with our system.

## 2022-08-22 NOTE — ED ADULT NURSE NOTE - OBJECTIVE STATEMENT
Pt reports left wrist, left hand pain after metal tray fell on to hand yesterday. Had negative xray done yesterday. Reports pain and difficulty moving hand and told to come to ED.

## 2022-08-22 NOTE — ED ADULT NURSE NOTE - CHIEF COMPLAINT QUOTE
pt c/o left wrist and hand pain since yesterday. stated " she was at [facility] and a metal tray fell on her hand, went to , had x-ray done and was told no fracture, but she is still in pain and can't move hand much, recommended by her PCP to come to ED"

## 2022-08-25 DIAGNOSIS — M79.642 PAIN IN LEFT HAND: ICD-10-CM
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
## 2022-08-25 DIAGNOSIS — S60.222A CONTUSION OF LEFT HAND, INITIAL ENCOUNTER: ICD-10-CM
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
## 2024-03-06 ENCOUNTER — APPOINTMENT (OUTPATIENT)
Dept: UROLOGY | Facility: CLINIC | Age: 59
End: 2024-03-06
Payer: COMMERCIAL

## 2024-03-06 VITALS
TEMPERATURE: 97.16 F | OXYGEN SATURATION: 95 % | HEART RATE: 96 BPM | SYSTOLIC BLOOD PRESSURE: 112 MMHG | DIASTOLIC BLOOD PRESSURE: 78 MMHG

## 2024-03-06 LAB
BILIRUB UR QL STRIP: NORMAL
CLARITY UR: CLEAR
COLLECTION METHOD: NORMAL
GLUCOSE UR-MCNC: NORMAL
HCG UR QL: 0.2 EU/DL
HGB UR QL STRIP.AUTO: NORMAL
KETONES UR-MCNC: NORMAL
LEUKOCYTE ESTERASE UR QL STRIP: NORMAL
NITRITE UR QL STRIP: NORMAL
PH UR STRIP: 6
PROT UR STRIP-MCNC: NORMAL
SP GR UR STRIP: <=1.005

## 2024-03-06 PROCEDURE — 81003 URINALYSIS AUTO W/O SCOPE: CPT | Mod: QW

## 2024-03-06 PROCEDURE — 99204 OFFICE O/P NEW MOD 45 MIN: CPT

## 2024-03-06 RX ORDER — SULFAMETHOXAZOLE AND TRIMETHOPRIM 800; 160 MG/1; MG/1
800-160 TABLET ORAL TWICE DAILY
Qty: 14 | Refills: 0 | Status: ACTIVE | COMMUNITY
Start: 2024-03-06 | End: 1900-01-01

## 2024-03-06 NOTE — PHYSICAL EXAM
[General Appearance - Well Nourished] : well nourished [Normal Appearance] : normal appearance [General Appearance - Well Developed] : well developed [General Appearance - In No Acute Distress] : no acute distress [Well Groomed] : well groomed [Abdomen Soft] : soft [Abdomen Tenderness] : non-tender [Costovertebral Angle Tenderness] : no ~M costovertebral angle tenderness [Urinary Bladder Findings] : the bladder was normal on palpation [Edema] : no peripheral edema [] : no respiratory distress [Exaggerated Use Of Accessory Muscles For Inspiration] : no accessory muscle use [Respiration, Rhythm And Depth] : normal respiratory rhythm and effort [Oriented To Time, Place, And Person] : oriented to person, place, and time [Affect] : the affect was normal [Not Anxious] : not anxious [Mood] : the mood was normal [Normal Station and Gait] : the gait and station were normal for the patient's age [No Palpable Adenopathy] : no palpable adenopathy [No Focal Deficits] : no focal deficits

## 2024-03-07 LAB
APPEARANCE: CLEAR
BACTERIA: NEGATIVE /HPF
BILIRUBIN URINE: NEGATIVE
BLOOD URINE: NEGATIVE
CAST: 0 /LPF
COLOR: YELLOW
EPITHELIAL CELLS: 2 /HPF
GLUCOSE QUALITATIVE U: NEGATIVE MG/DL
KETONES URINE: NEGATIVE MG/DL
LEUKOCYTE ESTERASE URINE: ABNORMAL
MICROSCOPIC-UA: NORMAL
NITRITE URINE: NEGATIVE
PH URINE: 6
PROTEIN URINE: NEGATIVE MG/DL
RED BLOOD CELLS URINE: 0 /HPF
SPECIFIC GRAVITY URINE: 1.01
UROBILINOGEN URINE: 0.2 MG/DL
WHITE BLOOD CELLS URINE: 3 /HPF
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
## 2024-03-07 NOTE — ASSESSMENT
[FreeTextEntry1] : I discussed the findings and options with Ms. FER LUNA in detail.   I reviewed the generic recommendations for UTI prevention with Ms. LUNA in detail, including: wiping front to back, increasing fluid intake. She will incorporate these and understands that she should have a urine culture prior to being treated with any courses of antibiotics.   - If she becomes more symptomatic, pt to start course of abx while we await culture results. Rx Bactrim DS sent to pharmacy.  - Given her c/o urinary leakage, we discussed urodynamics in great detail to obtain more objective functional information on her bladder.   A urine culture and analysis are pending, and we will call her if either result is positive.   Pt will plan to return in office for urodynamics (will do pelvic exam at that time to assess for prolapse).  Patient expressed understanding.    The total amount of time I have personally spent preparing for this visit, reviewing the patient's test results, obtaining external history, ordering tests/medications, documenting clinical information, communicating with and counseling the patient/family and/or caregiver(s), reviewing old records, and spent face to face with the patient explaining the above was 35 minutes.

## 2024-03-07 NOTE — LETTER BODY
[Dear  ___] : Dear  [unfilled], [Consult Letter:] : I had the pleasure of evaluating your patient, [unfilled]. [Consult Closing:] : Thank you very much for allowing me to participate in the care of this patient.  If you have any questions, please do not hesitate to contact me. [Please see my note below.] : Please see my note below. [Sincerely,] : Sincerely, [FreeTextEntry3] : Dr. Alia Luo\par

## 2024-03-07 NOTE — ADDENDUM
[FreeTextEntry1] : A portion of this note was written by [David Wheeler] on 03/06/2024 acting as a scribe for Dr. Luo.   I have personally reviewed the chart and agree that the record accurately reflects my personal performance of the history, physical exam, assessment, and plan.

## 2024-03-11 ENCOUNTER — NON-APPOINTMENT (OUTPATIENT)
Age: 59
End: 2024-03-11

## 2024-03-11 LAB — BACTERIA UR CULT: ABNORMAL

## 2024-03-11 RX ORDER — CIPROFLOXACIN HYDROCHLORIDE 500 MG/1
500 TABLET, FILM COATED ORAL
Qty: 10 | Refills: 0 | Status: ACTIVE | COMMUNITY
Start: 2024-03-11 | End: 1900-01-01

## 2024-04-03 RX ORDER — FLUCONAZOLE 150 MG/1
150 TABLET ORAL
Qty: 2 | Refills: 0 | Status: ACTIVE | COMMUNITY
Start: 2024-04-03 | End: 1900-01-01

## 2025-04-08 NOTE — ED ADULT NURSE NOTE - EXTENSIONS OF SELF_ADULT
None denies pmhx  fell off bed last night approx 2ft onto hardwood floor. no loc. now with vomiting this morning. bruising noted around right eye. no hematomas palpated on scalp. pt awake and alert, breathing comfortably, skin pink and warm.